# Patient Record
Sex: MALE
[De-identification: names, ages, dates, MRNs, and addresses within clinical notes are randomized per-mention and may not be internally consistent; named-entity substitution may affect disease eponyms.]

---

## 2022-12-15 ENCOUNTER — HOSPITAL ENCOUNTER (EMERGENCY)
Dept: HOSPITAL 97 - ER | Age: 22
Discharge: HOME | End: 2022-12-15
Payer: COMMERCIAL

## 2022-12-15 VITALS — DIASTOLIC BLOOD PRESSURE: 81 MMHG | SYSTOLIC BLOOD PRESSURE: 130 MMHG | OXYGEN SATURATION: 99 %

## 2022-12-15 VITALS — TEMPERATURE: 99 F

## 2022-12-15 DIAGNOSIS — R06.6: Primary | ICD-10-CM

## 2022-12-15 PROCEDURE — 99283 EMERGENCY DEPT VISIT LOW MDM: CPT

## 2022-12-15 NOTE — ER
Nurse's Notes                                                                                     

 [facility]                                                                 

Name: Yohannes Rosenberg Jr                                                                             

Age: 22 yrs                                                                                       

Sex: Male                                                                                         

: 2000                                                                                   

MRN: [id number]                                                                                   

Arrival Date: 12/15/2022                                                                          

Time: 20:02                                                                                       

Account#: [id number]                                                                            

Bed 17                                                                                            

Private MD:                                                                                       

Diagnosis: Hiccough                                                                               

                                                                                                  

Presentation:                                                                                     

12/15                                                                                             

20:10 Chief complaint: Patient states: The hiccups started yesterday. It stopped a little bit kd3 

      but they're back. I tried to give him sugar and water and he ate but they just wont go      

      away. He has had this problem before and they gave him some pills. Coronavirus screen:      

      Vaccine status: Patient reports receiving the 2nd dose of the covid vaccine. Ebola          

      Screen: No symptoms or risks identified at this time. Initial Sepsis Screen: Does the       

      patient meet any 2 criteria? No. Patient's initial sepsis screen is negative. Does the      

      patient have a suspected source of infection? No. Patient's initial sepsis screen is        

      negative. Risk Assessment: Do you want to hurt yourself or someone else? Patient            

      reports no desire to harm self or others. Onset of symptoms was December 15, 2022.          

20:10 Method Of Arrival: Ambulatory                                                           kd3 

20:10 Acuity: NICA 4                                                                           kd3 

                                                                                                  

Triage Assessment:                                                                                

20:13 General: Appears uncomfortable, Behavior is calm, cooperative. Pain: Complains of pain  kd3 

      in diaphragm. Neuro: Level of Consciousness is awake, alert, obeys commands.                

      Respiratory: Airway is patent Trachea midline Respiratory effort is even, unlabored,        

      Respiratory pattern is regular, symmetrical. GI: No signs and/or symptoms were reported     

      involving the gastrointestinal system.                                                      

                                                                                                  

Historical:                                                                                       

- Allergies:                                                                                      

20:13 No Known Allergies;                                                                     kd3 

- PMHx:                                                                                           

20:13 Premature birth;                                                                        kd3 

                                                                                                  

- Immunization history:: Adult Immunizations up to date.                                          

- Social history:: Smoking status: Patient denies any tobacco usage or history of.                

                                                                                                  

                                                                                                  

Screenin:01 [facility] ED Fall Risk Assessment (Adult) History of falling in the last 3 months,       kl  

      including since admission No falls in past 3 months (0 pts) Confusion or Disorientation     

      No (0 pts) Intoxicated or Sedated No (0 pts) Impaired Gait No (0 pts) Mobility Assist       

      Device Used No (0 pt) Altered Elimination Score/Fall Risk Level 0 - 2 = Low Risk            

      Oriented to surroundings, Maintained a safe environment, Assessed \T\ reinforced            

      patient's understanding of fall precautions, Hourly rounding (assess needs \T\ fall         

      precautionary measures) done. Abuse screen: Denies threats or abuse. Nutritional            

      screening: No deficits noted. Tuberculosis screening: No symptoms or risk factors           

      identified. Fall Risk No fall in past 12 months (0 pts).                                    

                                                                                                  

Assessment:                                                                                       

20:40 General: Appears in no apparent distress. comfortable, Behavior is calm, cooperative.   kl  

      Pain: Denies pain. Neuro: No deficits noted. Cardiovascular: No deficits noted.             

      Respiratory: No deficits noted. GI: Reports tolerance of fluids, tolerance of food,         

      hiccups. : No deficits noted. No signs and/or symptoms were reported regarding the        

      genitourinary system. EENT: No deficits noted.                                              

                                                                                                  

Vital Signs:                                                                                      

20:10  / 75; Pulse 83; Resp 17; Temp 99(TE); Pulse Ox 100% on R/A; Weight 54.43 kg;     kd3 

21:01  / 81; Pulse 91; Resp 18; Pulse Ox 99% on R/A;                                      

                                                                                                  

ED Course:                                                                                        

20:02 Patient arrived in ED.                                                                    

20:08 Ryan Encinas NP is PHCP.                                                           pm1 

20:08 Campos Song MD is Attending Physician.                                             pm1 

20:13 Triage completed.                                                                       kd3 

20:13 Arm band placed on right wrist.                                                         kd3 

21:01 No provider procedures requiring assistance completed. Patient did not have IV access   kl  

      during this emergency room visit.                                                           

                                                                                                  

Administered Medications:                                                                         

21:00 Drug: Reglan (metoCLOPramide) 10 mg Route: PO;                                            

21:00 Follow up: Response: No adverse reaction; Marked relief of symptoms                       

                                                                                                  

                                                                                                  

Medication:                                                                                       

21:01 VIS not applicable for this client.                                                       

                                                                                                  

Outcome:                                                                                          

20:40 Discharge ordered by MD.                                                                pm1 

21:02 Discharged to home ambulatory, with family.                                             kl  

21:02 Condition: stable                                                                           

21:02 Discharge instructions given to patient, caretaker, Instructed on discharge                 

      instructions, follow up and referral plans. Demonstrated understanding of instructions,     

      follow-up care.                                                                             

21:02 Patient left the ED.                                                                      

                                                                                                  

Signatures:                                                                                       

Michelle Ortez RN RN kl Salyer, Edna es Marinas, Patrick, NP                    NP   pm1                                                  

Genie Selby RN RN   kd3                                                  

                                                                                                  

**************************************************************************************************

## 2022-12-15 NOTE — XMS REPORT
Continuity of Care Document

                          Created on:December 15, 2022



Patient:MR EMERITA SCHAFER

Sex:Male

:2000

External Reference #:[id number]





Demographics







                          Address                   [address]



                                                    [place] [address]

 

                          Home Phone                [phone]

 

                          Email Address             [place]

 

                          Preferred Language        English

 

                          Marital Status            Unknown

 

                          Mu-ism Affiliation     Unknown

 

                          Race                      Unknown

 

                          Additional Race(s)        Unavailable

 

                          Ethnic Group              Unknown









Author







                          Organization              [facility]

t

 

                          Address                   [address]



                                                    [place] [address]

 

                          Phone                     [phone]









Support







                Name            Relationship    Address         Phone

 

                BONILLA TREDAWELL  OtherRelationship [address]      Unavailable



                                                [place] [address] 

 

                BONILLA TREADWELL  OtherRelationship [address]      Unavailable



                                                [place] [address] 









Care Team Providers







                    Name                Role                Phone

 

                    TEREZA SMITH    Primary Care Physician Unavailable

 

                    KEVIN, DR CRUZ     Attending Clinician Unavailable

 

                    JASON CERON Attending Clinician Unavailable

 

                    WEN, DR ROWE         Attending Clinician Unavailable

 

                    DR HELEN SMITH Attending Clinician Unavailable

 

                    ADELA, DR SIMS Attending Clinician Unavailable

 

                    SARAH, DR MACKEY      Attending Clinician Unavailable

 

                    KEVIN, DR CRUZ     Admitting Clinician Unavailable

 

                    JASON CERON Admitting Clinician Unavailable

 

                    WEN, DR ROWE         Admitting Clinician Unavailable

 

                    SARAH, DR HELEN MCCRACKEN Admitting Clinician Unavailable

 

                    ADELA, DR SIMS Admitting Clinician Unavailable

 

                    SARAH, DR MACKEY      Admitting Clinician Unavailable









Payers







           Payer Name Policy Type Policy Number Effective Date Expiration Date S

ource

 

           [id number]                  [id number]  2022 00:00:00            

 

           0775                  [id number]  2017 00:00:00            







Problems

This patient has no known problems.



Allergies, Adverse Reactions, Alerts







       Allergy Allergy Status Severity Reaction(s) Onset  Inactive Treating Comm

ents 

Source



       Name   Type                        Date   Date   Clinician        

 

       No Known DA     Active Unknown                              [facility]



       Allergie                                                     Medical



       s                                  00:00:                      Center



                                          00                          







Medications

This patient has no known medications.



Vital Signs







             Vital Name   Observation Time Observation Value Comments     Source

 

             Height       2022 11:24:00 149.86 CM                 

 

             Weight       2022 11:24:00 51.7 KG                   

 

             Height       2022 17:03:00 147.32 CM                 

 

             Weight       2022 17:03:00 47.62 KG                  

 

             Height       2021 11:48:00 149.86 CM                 

 

             Weight       2021 11:48:00 54.43 KG                  

 

             Weight       2020 16:44:00 48.98 KG                  

 

             Weight       2020 14:43:00 47.62 KG                  

 

             Height       2020 18:33:00 160.02 CM                 

 

             Weight       2020 18:33:00 48.5 KG                   

 

             Height       2019-12-15 10:06:00 147.32 CM                 

 

             Weight       2019-12-15 10:06:00 47.62 KG                  







Procedures

This patient has no known procedures.



Encounters







        Start   End     Encounter Admission Attending Care    Care    Encounter 

Source



        Date/Time Date/Time Type    Type    Clinicians Facility Department ID   

   

 

        2022 Outpatient E       KEVIN [facility]     ECC     [id number]

[address]



        11:08:00 12:00:00                 ANTHONY                            [facility]

[facility]

 

        2022-07-15 2022-07-15 Outpatient C       OSMANY [facility]     RAD     [id number]

[address]



        08:47:00 23:59:00                 JASON                            [facility]

[facility]

 

        2022 Outpatient E       BA, SOLEDAD [facility]     ECC     [id number]

[address]



        16:46:00 18:19:00                                                 [facility]

[facility]

 

        2021 Outpatient E       SARAH [facility]     ECC     [id number]

[id number]



        11:36:00 13:08:00                 HELEN                           [facility]

[facility]

 

        2020 Outpatient E       BA, SOLEDAD [facility]     ECC     [id number]

[address]



        16:29:00 18:06:00                                                 [facility]

[facility]

 

        2020 Outpatient E       BA, SOLEDAD [facility]     ECC     [id number]

[address]



        14:32:00 15:03:00                                                 [facility]

[facility]

 

        2020 Outpatient E       ADELA [facility]     ECC     [id number]

[address]



        18:26:00 18:50:00                 LEVI                          [facility]

[facility]

 

        2019-12-15 2019-12-15 Outpatient E       BA, SOLEDAD [facility]     ECC     [id number]

[address]



        10:06:00 11:13:00                                                 [facility]

[facility]

 

        2017 Emergency E       SMITH NARENDRA [facility]     ECC     [address]

[id number]



        14:00:00 15:00:00                                                 [facility]

l



                                                                        Center







Results







           Test Description Test Time  Test Comments Results    Result Comments 

Source









                    SARS-CoV (RAPID ANTIGEN) **WH** 2022 11:51:00 









                      Test Item  Value      Reference Range Interpretation Comme

nts









             SARS-CoV (ANTIGEN) (test code = POSITIVE     NEGATIVE     AA       

    



             COVAG)                                              

 

             COVID AG (test code = COVAGC) *** This test has been marketed under

                           



                          the FDA Emergency Use Authorization                   

        



                          (EUA) to meet challenges of the                       

    



                          COVID-19 pandemic. The validation                     

      



                          standards normally enforced by the FDA                

           



                          and the College of the American                       

    



                          Pathologists (CAP) are more stringent                 

          



                          than those required for this test.                    

       



                          Therefore, the result should be                       

    



                          interpreted with caution and close                    

       



                          attention to other clinical and                       

    



                          epidemiological data ***                           



DIRECT STREP GROUP A**OW**2022 11:45:00





             Test Item    Value        Reference Range Interpretation Comments

 

             Strep A Ag (test code = STREP) NEGATIVE     NEGATIVE               

   



U/S KIDNEY (RENAL) *OW*2022-07-15 11:19:37
************************************************************[facility] : 2000 Sex: 
M************************************************************EXAMINATION: US KI
DNEY BILATERAL.HISTORY: Microscopic hematuria.COMPARISON: None.FINDINGS: 
Examination is limited due to patient cooperation during image 
acquisition.Sonographic evaluation of the kidneys and bladder wasperformed 
utilizing gray scale, pulse Doppler and color flow imaging.The right kidney 
measures 8.7 cm and the left kidney measures 8.9 cm. The parenchymal 
echogenicity is within normal limits on both sides. There is no hydronephrosis. 
No calculus is identified.Urinary bladder is underdistended with volume of 82 mL
and post void residual 2 mL. Bilateral ureteral jets noted.IMPRESSION:No 
hydronephrosis.Electronically signed by: Monique Gamble MD 7/15/2022 11:19 AM 
CDT Workstation: [id number] 8 Panel *OW* [id number] 17:54:00





             Test Item    Value        Reference Range Interpretation Comments

 

             GLUCOSE (test code = GGUL) 111 mg/dL                        

 

             BUN (test code = GBUN) 13 mg/dL     7-22                      

 

             CREATININE (test code = GCRE) 0.8 mg/dL    0.6-1.2                 

  

 

             CK TOTAL (test code = GCK) 303 U/L                          

 

             SODIUM (test code = GNA+) 141 mmol/L   128-145                   

 

             POTASSIUM (test code = GK+) 3.9 mmol/L   3.6-5.1                   

 

             CHLORIDE (test code = GCL-) 105 mmol/L                       

 

             TCO2 (test code = GTC02) 29 mmol/L    18-33                     



CBC (INCLUDES AUTOMATED DIFFERENTIAL) *2022 17:43:00





             Test Item    Value        Reference Range Interpretation Comments

 

             WBC (test code = WBC) 8.7 10\S\3/uL 4.5-11.0                  

 

             RBC (test code = RBC) 5.16 10\S\6/uL 4.30-5.70                 

 

             HGB (test code = HBG) 15.9 g/dL    14.0-18.0                 

 

             HCT (test code = HCT) 46.5 %       35.0-46.0    H            

 

             MCV (test code = MCV) 90.1 fL      80.0-94.0                 

 

             MCH (test code = MCH) 30.8 pg      27.0-31.0                 

 

             MCHC (test code = MCHC) 34.2 g/dL    32.0-36.0                 

 

             RDW (test code = RDW) 12.9 %       11.5-14.5                 

 

             PLT (test code = PLT) 350 10\S\3/uL 130-400                   

 

             MPV (test code = OMPV) 7.4 fL       6.2-10.2                  

 

             NEUTROP # (test code = NE#) 5.5 10\S\3/uL 2.0-8.0                  

 

 

             LYMPH # (test code = LY#) 2.3 10\S\3/uL 1.2-4.0                   

 

             MID # (test code = GMID#) 0.9 10\S\3/uL 0.0-1.1                   

 

             GRAN % (test code = GRA%) 63.2 %       35.0-73.0                 

 

             LYMPH % (test code = GLY%) 27.0 %       20.0-55.0                 

 

             MID % (test code = GMID%) 9.8 %        0.0-10.0                  



SARS-CoV (RAPID ANTIGEN) **WH**2021 12:18:00





             Test Item    Value        Reference Range Interpretation Comments

 

             SARS-CoV (ANTIGEN) NEGATIVE     NEGATIVE                  



             (test code =                                        



             COVAG)                                              

 

             COVID AG (test *** This test has been                           



             code = COVAGC) marketed under the FDA                           



                          Emergency Use Authorization                           



                          (EUA) to meet challenges of                           



                          the COVID-19 pandemic. The                           



                          validation standards                           



                          normally enforced by the                           



                          FDA and the College of the                           



                          American Pathologists (CAP)                           



                          are more stringent than                           



                          those required for this                           



                          test. Therefore, the result                           



                          should be interpreted with                           



                          caution and close attention                           



                          to other clinical and                           



                          epidemiological data ***                           



DIRECT STREP GROUP A**OW**2021 12:13:00





             Test Item    Value        Reference Range Interpretation Comments

 

             Strep A Ag (test code = STREP) NEGATIVE     NEGATIVE               

   



INFLUENZA A AND B **OW**2021 12:13:00





             Test Item    Value        Reference Range Interpretation Comments

 

             INFLUENZ A (test code = INFA) NEGATIVE     NEGATIVE                

  

 

             INFLUENZ B (test code = INFB) NEGATIVE     NEGATIVE                

  



DRUGS OF ABUSE*OW*2020 17:37:00





             Test Item    Value        Reference Range Interpretation Comments

 

             DRUG SCRN (test code ****URINE DRUG SCREEN***                      

     



             = HDOA)      ***This is an unconfirmed                           



                          screening result and                           



                          should not be used for                           



                          non-medical purposes***                           

 

             PHENCYCLID (test Negative     NEGATIVE                  



             code = GPCP)                                        

 

             BENZODIAZE (test Negative     NEGATIVE                  



             code = GBZO)                                        

 

             COCAINE (test code = Negative     NEGATIVE                  



             GCOC)                                               

 

             AMPHETAMIN (test Negative     NEGATIVE                  



             code = GAMP)                                        

 

             THC (test code = Negative     NEGATIVE                  



             GTHC)                                               

 

             OPIATES (test code = Negative     NEGATIVE                  



             MILLI)                                               

 

             BARBITURAT (test Negative     NEGATIVE                  



             code = GBAR)                                        

 

             TCA (test code = Negative     NEGATIVE                  



             GTCA)                                               

 

             DOAH (test code = **************URINE DRUG                         

  



             DOAH)        SCREEN CUT OFF                           



                          VALUES****************                           



                          Amphetamines 1000 ng/mL                           



                          Barbituates 300 ng/mL                           



                          Benzodiazepines 300 ng/mL                           



                          Cocaine 300 ng/mL Opiates                           



                          300 ng/mL Phencyclidine                           



                          25 ng/mL THC 50 ng/mL                           



                          Tricyclic                              



                          Antidepressants 1000                           



                          ng/mL                                  



                          *************************                           



                          *************************                           



                          *********************                           



GENERAL CHEMISTRY 13 *OW* [id number] 17:34:00





             Test Item    Value        Reference Range Interpretation Comments

 

             GLUCOSE (test code = GGUL) 125 mg/dL           H            

 

             BUN (test code = GBUN) 16 mg/dL     7-22                      

 

             CREATININE (test code = GCRE) 1.1 mg/dL    0.6-1.2                 

  

 

             URIC ACID (test code = GUA) 5.8 mg/dL    3.6-8.0                   

 

             CALCIUM (test code = GCL+) 10.1 mg/dL   8.0-10.3                  

 

             ALBUMIN (test code = GALB) 4.0 g/dL     3.5-5.5                   

 

             PROTEIN (test code = GTP) 7.2 g/dL     6.4-8.1                   

 

             ALT (test code = GALT) 18 U/L       10-47                     

 

             AST (test code = FUENTES) 30 U/L       11-38                     

 

             ALK PHOS (test code = GALP) 73 U/L                           

 

             BILI TOTAL (test code = GTBIL) 1.4 mg/dL    0.2-1.6                

   

 

             GGT (test code = GGGT) 11 U/L       5-65                      

 

             AMYLASE (test code = GAMY) 68 U/L       14-97                     



URINALYSIS W/O MICROSCOPIC**OW**2020 17:26:00





             Test Item    Value        Reference Range Interpretation Comments

 

             COLOR (test code = Yellow       YELLOW                    



             COLU)                                               

 

             CLARITY (test code = Clear        CLEAR                     



             CLA)                                                

 

             GLUCOSE UR (test Negative     NEGATIVE                  



             code = UA GLUCOSE)                                        

 

             BILI UR (test code = Negative     NEGATIVE                  



             BILE)                                               

 

             KETONES UR (test Trace        NEGATIVE                  



             code = ACE)                                         

 

             SP GRAVITY (test >=1.030      1.005-1.030               



             code = SPGR)                                        

 

             PH UR (test code = 6.0          4.5-8.0                   



             PH)                                                 

 

             PROTEIN UR (test 1+           NEGATIVE     A            



             code = PU)                                          

 

             NITRITE UR (test Negative     NEGATIVE                  



             code = NITRITE)                                        

 

             UROBIL UR (test code 1.0 E.U./dL                            



             = GUROQ)                                            

 

             UROBIL UR (test code ***** UROBILINOGEN                           



             = GUROQC)    REFERENCE RANGE ***** 0.2                           



                          - 1.0 EU/dL                            

 

             BLOOD UR (test code Negative     NEGATIVE                  



             = UA BLOOD)                                         

 

             LEUK ES UR (test Negative     NEGATIVE                  



             code = LEUK)                                        



[employer] 8 Panel *OW* [id number] 17:21:00





             Test Item    Value        Reference Range Interpretation Comments

 

             GLUCOSE (test code = GGUL) 120 mg/dL           H            

 

             BUN (test code = GBUN) 15 mg/dL     7-22                      

 

             CREATININE (test code = GCRE) 1.0 mg/dL    0.6-1.2                 

  

 

             CK TOTAL (test code = GCK) 465 U/L             H            

 

             SODIUM (test code = GNA+) 143 mmol/L   128-145                   

 

             POTASSIUM (test code = GK+) 3.6 mmol/L   3.6-5.1                   

 

             CHLORIDE (test code = GCL-) 105 mmol/L                       

 

             TCO2 (test code = GTC02) 30 mmol/L    18-33                     



CBC (INCLUDES AUTOMATED DIFFERENTIAL) *2020 17:04:00





             Test Item    Value        Reference Range Interpretation Comments

 

             WBC (test code = WBC) 9.2 10\S\3/uL 4.5-13.0                  

 

             RBC (test code = RBC) 5.21 10\S\6/uL 4.30-5.70                 

 

             HGB (test code = HBG) 15.6 g/dL    14.0-18.0                 

 

             HCT (test code = HCT) 48.3 %       35.0-46.0    H            

 

             MCV (test code = MCV) 92.7 fL      80.0-94.0                 

 

             MCH (test code = MCH) 29.9 pg      27.0-31.0                 

 

             MCHC (test code = MCHC) 32.3 g/dL    32.0-36.0                 

 

             RDW (test code = RDW) 14.5 %       11.5-14.5                 

 

             PLT (test code = PLT) 275 10\S\3/uL 130-400                   

 

             MPV (test code = OMPV) 7.8 fL       6.2-10.2                  

 

             NEUTROP # (test code = NE#) 6.8 10\S\3/uL 2.0-8.0                  

 

 

             LYMPH # (test code = LY#) 1.8 10\S\3/uL 1.2-4.0                   

 

             MID # (test code = GMID#) 0.7 10\S\3/uL 0.0-1.1                   

 

             GRA % (test code = GRA%) 73.8 %       35.0-73.0    H            

 

             LYMPH % (test code = GLY%) 19.1 %       20.0-55.0    L            

 

             MID % (test code = GMID%) 7.1 %        0.0-10.0                  



XR SPINE LUMBAR COMPLETE *OW*2019-12-15 10:34:39EXAM: Lumbar spine series, 5 
viewsDictation location: [id number]: PainCOMPARISON: None.DISCUSSION:Frontal, 
bilateral oblique, spot lateral, and lateral views of thelumbar spine are 
submitted. There are 5 lumbar-type non-rib-bearing vertebralbodies. No fracture 
or osteolytic or osteoblastic lesions are identified. Discspace height is well 
preserved. Facet joints are unremarkable. No spondylolysisorspondylolisthesis is
seen. Straightening of lumbar lordosis is noted.IMPRESSION: Straightening of lum
bar lordosis. Otherwise, unremarkable lumbarspine radiographs.URINALYSIS W/O 
MICROSCOPIC**OW**2019-12-15 10:27:00





             Test Item    Value        Reference Range Interpretation Comments

 

             COLOR (test code = Yellow       YELLOW                    



             COLU)                                               

 

             CLARITY (test code = Clear        CLEAR                     



             CLA)                                                

 

             GLUCOSE UR (test Negative     NEGATIVE                  



             code = UA GLUCOSE)                                        

 

             BILI UR (test code = Negative     NEGATIVE                  



             BILE)                                               

 

             KETONES UR (test Negative     NEGATIVE                  



             code = ACE)                                         

 

             SP GRAVITY (test 1.020        1.005-1.030               



             code = SPGR)                                        

 

             PH UR (test code = 7.0          4.5-8.0                   



             PH)                                                 

 

             PROTEIN UR (test Negative     NEGATIVE                  



             code = PU)                                          

 

             NITRITE UR (test Negative     NEGATIVE                  



             code = NITRITE)                                        

 

             UROBIL UR (test code 2.0 E.U./dL                            



             = GUROQ)                                            

 

             UROBIL UR (test code ***** UROBILINOGEN                           



             = GUROQC)    REFERENCE RANGE ***** 0.2                           



                          - 1.0 EU/dL                            

 

             BLOOD UR (test code Negative     NEGATIVE                  



             = UA BLOOD)                                         

 

             LEUK ES UR (test Negative     NEGATIVE                  



             code = LEUK)                                        



CT ABDOMEN AND PELVIS WITH [id number] 13:08:17LOCATION: [id number]: 
16-year-old male presents with right lower quadrant abdominal pain.COMMENT: Mukesh frances 3Axial CT imaging of this patient's abdomen and pelvis was obtained during 
IVcontrast injection. Coronal and sagittal soft tissue reconstructions 
wereincluded. An older examination of 09/06/15 is available for comparison.One 
or more of the following dose reduction techniques are used: Automatedexposure 
control, adjustment of the mA and/or kV according the patient size,and/or 
utilization of iterative reconstruction technique.DLP: 780 mGy-cmCONTRAST: 98 mL
of Omnipaque 300 nonionic contrast was injected patient's righthand. Serum 
creatinine level was 0.9.FINDINGS:The lung bases are clear. The cardiac 
silhouette is unremarkable.The liver, spleen, pancreas, adrenal glands, kidneys,
and gallbladder areunremarkable.The upper intestinal tract and small intestine 
are unremarkable. Anormal-appearing appendix is seen.The colon is 
unremarkable.There is no ascites or adenopathy seen in the abdomen or the
pelvis.In the pelvis the urinary bladder, prostate gland, seminal vesicles 
areunremarkable.The vascular anatomy is unremarkable.The musculoskeletal anatomy
is unremarkable.IMPRESSION:Unremarkable CT examination of the abdomen and 
pelvis.AMYLASE AND [id number] 12:28:00





             Test Item    Value        Reference Range Interpretation Comments

 

             AMYLASE (test code = 10A) 58 U/L                           

 

             LIPASE (test code = 60A) 63 IU/L             L            



COMPREHENSIVE METABOLIC [id number] 12:28:00





             Test Item    Value        Reference Range Interpretation Comments

 

             GLUCOSE (test code = 06D) 92 mg/dL                         

 

             SODIUM (test code = 01A) 143 mmol/L   136-145                   

 

             POTASSIUM (test code = 01B) 4.9 mmol/L   3.6-5.1                   

 

             CHLORIDE (test code = 04A) 105 mmol/L                       

 

             CO2 (test code = 02A) 29 mmol/L    22-32                     

 

             ANION GAP (test code = ANG) 13.9 mmol/L                            

 

             BUN (test code = 05D) 14 mg/dL     7-18                      

 

             CREATININE (test code = 03E) 0.9 mg/dL    0.7-1.3                  

 

 

             BUN/CREA R (test code = BCR) 15           12-20                    

 

 

             CALCIUM (test code = 09D) 8.9 mg/dL    8.3-9.5                   

 

             BILI TOTAL (test code = 11A) 1.2 mg/dL    0.2-1.0      H           

 

 

             PROTEIN (test code = 07D) 7.3 g/dL     6.0-8.0                   

 

             ALBUMIN (test code = 08D) 4.1 g/dL     3.5-4.8                   

 

             GLOBULIN (test code = GLB) 3.2 g/dL     1.5-3.8                   

 

             ALB/GLOB (test code = AGRR) 1.3          1.0-2.6                   

 

             ALK PHOS (test code = 35A) 95 IU/L                          

 

             AST (test code = 30A) 28 IU/L      <=42                      

 

             ALT (test code = 31A) 26 IU/L      <=78                      



URINALYSIS WITH [id number] 12:12:00





             Test Item    Value        Reference Range Interpretation Comments

 

             COLOR (test code = COLU) YELLOW       YELLOW                    

 

             CLARITY (test code = CLA) CLOUDY       CLEAR        A            

 

             GLUCOSE UR (test code = UA NEGATIVE     NEGATIVE                  



             GLUCOSE)                                            

 

             BILI UR (test code = BILE) NEGATIVE     NEGATIVE                  

 

             KETONES UR (test code = ACE) NEGATIVE     NEGATIVE                 

 

 

             SP GRAVITY (test code = SPGR) 1.018        1.005-1.030             

  

 

             PH UR (test code = PH) 7.5          4.5-8.0                   

 

             PROTEIN UR (test code = PU) TRACE        NEGATIVE     A            

 

             UROBIL UR (test code = UROQ) 1.0 EU/dL    0.2-1.0                  

 

 

             NITRITE UR (test code = NEGATIVE     NEGATIVE                  



             NITRITE)                                            

 

             BLOOD UR (test code = UA BLOOD) NEGATIVE     NEGATIVE              

    

 

             LEUK ES UR (test code = LEUK) 1+           NEGATIVE     A          

  

 

             WBC UR (test code = UWBC) 2 /HPF       0-3                       

 

             RBC UR (test code = URBC) 0 /HPF       0-2                       

 

             EPITH UR (test code = UEPC) FEW /LPF     NONE         A            

 

             BACTERIA UR (test code = UBACT) MODERATE /HPF NONE         A       

     

 

             CAST UR (test code = CAST)  /LPF        NONE                      

 

             CRYSTAL UR (test code = CRYU)  / LPF       NONE                    

  

 

             MUCUS UR (test code = MUC)  / HPF       NONE                      

 

             AMORPH UR (test code = MARIELLE)  / HPF       NONE                     

 

 

             TRICH UR (test code = UTRICH)  /HPF        NONE                    

  

 

             YEAST UR (test code = UY)  /HPF        NONE                      

 

             SPERM UR (test code = USPERM)  /HPF        NONE                    

  



CBC (INCLUDES AUTOMATED DIFFERENTIAL)2017 12:10:00





             Test Item    Value        Reference Range Interpretation Comments

 

             WBC (test code = WBC) 9.5 10\S\3/uL 4.5-13.0                  

 

             RBC (test code = RBC) 5.39 10\S\6/uL 4.10-5.20    H            

 

             HGB (test code = HBG) 16.5 g/dL    11.5-15.5    H            

 

             HCT (test code = HCT) 45.3 %       35.0-45.0    H            

 

             MCV (test code = MCV) 84.0 fL      77.0-95.0                 

 

             MCH (test code = MCH) 30.6 pg      25.0-33.0                 

 

             MCHC (test code = MCHC) 36.4 g/dL    31.0-37.0                 

 

             RDW (test code = RDW) 12.1 %       11.5-14.5                 

 

             PLT (test code = PLT) 281 10\S\3/uL 130-400                   

 

             MPV (test code = MPV) 9.8 fL       9.4-12.4                  

 

             NEUTROP # (test code = NE#) 6.5 10\S\3/uL 2.0-8.0                  

 

 

             LYMPH # (test code = LY#) 2.3 10\S\3/uL 1.2-4.0                   

 

             MONOCYTE # (test code = MO#) 0.6 10\S\3/uL 0.0-1.1                 

  

 

             EOSINOPH # (test code = EO#) 0.1 10\S\3/uL 0.0-0.7                 

  

 

             BASOPHIL # (test code = BA#) 0.1 10\S\3/uL 0.0-0.3                 

  

 

             IG # (test code = IG#) 0.03 10\S\3/uL 0.00-0.06                 

 

             NRBC # (test code = NRBC#) 0.00 10\S\3/uL 0.00-0.01                

 

 

             NEUTROPH % (test code = NE%) 68.1 %       35.0-73.0                

 

 

             LYMPH % (test code = LY%) 24.0 %       20.0-55.0                 

 

             MONO % (test code = MO%) 6.2 %        2.5-10.0                  

 

             EOSINOPH % (test code = EO%) 0.9 %        0.0-5.0                  

 

 

             BASOPHIL % (test code = BA%) 0.5 %        0.0-2.0                  

 

 

             IG % (test code = IG%) 0.3 %        0.0-0.8                   

 

             NRBC% (test code = NRBC%) 0.0 %        0.0-0.2                   

 

             MANDIFF (test code = MDIFF) NO           NO                        

 

             RBC MORPH (test code = RBCMOR)              NORMAL

## 2022-12-15 NOTE — EDPHYS
Physician Documentation                                                                           

 [facility]                                                                 

Name: Yohannes Rosenberg Jr                                                                             

Age: 22 yrs                                                                                       

Sex: Male                                                                                         

: 2000                                                                                   

MRN: [id number]                                                                                   

Arrival Date: 12/15/2022                                                                          

Time: 20:02                                                                                       

Account#: [id number]                                                                            

Bed 17                                                                                            

Private MD:                                                                                       

ED Physician Campos Song                                                                      

HPI:                                                                                              

12/15                                                                                             

20:39 This 22 yrs old  Male presents to ER via Ambulatory with complaints of hiccups. pm1 

20:39 Onset: The symptoms/episode began/occurred last night. Associated signs and symptoms:   pm1 

      The patient has no apparent associated signs or symptoms. Modifying factors: The            

      patient symptoms are alleviated by food and fluids. Mother gave him some sugar with         

      water and his hiccups resolved. Hiccups returned today after eating some spicy Mexican      

      food for dinner. Hiccups resolved now. The patient has not experienced similar symptoms     

      in the past. The patient has not recently seen a physician.                                 

                                                                                                  

Historical:                                                                                       

- Allergies:                                                                                      

20:13 No Known Allergies;                                                                     kd3 

- PMHx:                                                                                           

20:13 Premature birth;                                                                        kd3 

                                                                                                  

- Immunization history:: Adult Immunizations up to date.                                          

- Social history:: Smoking status: Patient denies any tobacco usage or history of.                

                                                                                                  

                                                                                                  

ROS:                                                                                              

20:39 Constitutional: Negative for fever, chills, and weight loss, Cardiovascular: Negative   pm1 

      for chest pain, palpitations, and edema, Respiratory: Negative for shortness of breath,     

      cough, wheezing, and pleuritic chest pain.                                                  

20:39 Back: Negative for injury and pain, MS/Extremity: Negative for injury and deformity,        

      Skin: Negative for injury, rash, and discoloration, Neuro: Negative for headache,           

      weakness, numbness, tingling, and seizure.                                                  

20:39 Abdomen/GI: Positive for hiccups, Negative for abdominal pain, nausea, vomiting, and        

      diarrhea, constipation.                                                                     

20:39 All other systems are negative.                                                             

                                                                                                  

Exam:                                                                                             

20:39 Constitutional:  This is a well developed, well nourished patient who is awake, alert,  pm1 

      and in no acute distress.  Patient without any hiccups present during evaluation            

20:39 Head/Face:  Normocephalic, atraumatic.                                                      

20:39 Back:  No spinal tenderness.  No costovertebral tenderness.  Full range of motion.          

      Skin:  Warm, dry with normal turgor.  Normal color with no rashes, no lesions, and no       

      evidence of cellulitis. MS/ Extremity:  Pulses equal, no cyanosis.  Neurovascular           

      intact.  Full, normal range of motion.                                                      

20:39 Eyes: Exam is negative for acute changes, Extraocular movements: no acute changes,          

      Conjunctiva: no acute changes, no injection.                                                

20:39 ENT: Exam is negative for acute changes, Mouth: no acute changes, Lips: normal, moist,      

      Oral mucosa: normal, pink and intact, moist.                                                

20:39 Cardiovascular: Exam negative for  acute changes, Rate: normal, Rhythm: regular,            

      Pulses: no pulse deficits are appreciated.                                                  

20:39 Respiratory: Exam negative for  acute changes, respiratory distress, shortness of           

      breath.                                                                                     

20:39 Neuro: Exam negative for acute changes, Orientation: is normal, Mentation: is normal,       

      Motor:                                                                                      

                                                                                                  

Vital Signs:                                                                                      

20:10  / 75; Pulse 83; Resp 17; Temp 99(TE); Pulse Ox 100% on R/A; Weight 54.43 kg;     kd3 

21:01  / 81; Pulse 91; Resp 18; Pulse Ox 99% on R/A;                                    kl  

                                                                                                  

MDM:                                                                                              

20:16 Patient medically screened.                                                             [facility] 

20:39 Data reviewed: vital signs. Data interpreted: Pulse oximetry: on room air is 100 %.     pm1 

      Interpretation: normal. Counseling: I had a detailed discussion with the patient and/or     

      guardian regarding: the historical points, exam findings, and any diagnostic results        

      supporting the discharge/admit diagnosis, the need for outpatient follow up, to return      

      to the emergency department if symptoms worsen or persist or if there are any questions     

      or concerns that arise at home.                                                             

                                                                                                  

Administered Medications:                                                                         

21:00 Drug: Reglan (metoCLOPramide) 10 mg Route: PO;                                          kl  

21:00 Follow up: Response: No adverse reaction; Marked relief of symptoms                     kl  

                                                                                                  

                                                                                                  

Disposition Summary:                                                                              

12/15/22 20:40                                                                                    

Discharge Ordered                                                                                 

      Location: Home                                                                          pm1 

      Problem: new                                                                            pm1 

      Symptoms: have improved                                                                 pm1 

      Condition: Stable                                                                       pm1 

      Diagnosis                                                                                   

        - Hiccough                                                                            pm1 

      Followup:                                                                               pm1 

        - With: Emergency Department                                                               

        - When: As needed                                                                          

        - Reason: Worsening of condition                                                           

      Followup:                                                                               pm1 

        - With: Private Physician                                                                  

        - When: 2 - 3 days                                                                         

        - Reason: Recheck today's complaints, Continuance of care, Re-evaluation by your           

      physician                                                                                   

      Discharge Instructions:                                                                     

        - Discharge Summary Sheet                                                             pm1 

        - [place]                                                                             pm1 

      Forms:                                                                                      

        - Medication Reconciliation Form                                                      pm1 

        - Thank You Letter                                                                    pm1 

        - Antibiotic Education                                                                pm1 

        - Prescription Opioid Use                                                             pm1 

Signatures:                                                                                       

Michelle Ortez, Campos Cade RN, MD MD cha Marinas, Patrick, NP                    NP   pm1                                                  

Genie Selby RN                      RN   kd3                                                  

                                                                                                  

**************************************************************************************************